# Patient Record
(demographics unavailable — no encounter records)

---

## 2024-09-26 NOTE — REVIEW OF SYSTEMS
[Negative] : Allergic/Immunologic [Anal Pain: Grade 0] : Anal Pain: Grade 0 [Constipation: Grade 0] : Constipation: Grade 0 [Diarrhea: Grade 0] : Diarrhea: Grade 0 [Dyspepsia: Grade 0] : Dyspepsia: Grade 0 [Dysphagia: Grade 0] : Dysphagia: Grade 0 [Esophagitis: Grade 0] : Esophagitis: Grade 0 [Fecal Incontinence: Grade 0] : Fecal Incontinence: Grade 0 [Gastroparesis: Grade 0] : Gastroparesis: Grade 0 [Nausea: Grade 0] : Nausea: Grade 0 [Proctitis: Grade 0] : Proctitis: Grade 0 [Rectal Pain: Grade 0] : Rectal Pain: Grade 0 [Small Intestinal Obstruction: Grade 0] : Small Intestinal Obstruction: Grade 0 [Vomiting: Grade 0] : Vomiting: Grade 0 [Hematuria: Grade 0] : Hematuria: Grade 0 [Urinary Incontinence: Grade 0] : Urinary Incontinence: Grade 0  [Urinary Retention: Grade 0] : Urinary Retention: Grade 0 [Urinary Tract Pain: Grade 0] : Urinary Tract Pain: Grade 0

## 2024-10-01 NOTE — HISTORY OF PRESENT ILLNESS
[FreeTextEntry1] :   Percy Javier is 55-year-old male with a no family history of Prostate Ca, PMH Lunga Ca s/p right lobectomy. he is under the care of Dr. Esteves for elevated PSA and underwent MRI Prostate on 5/2024 that revealed PI-RADS 3, subtle focal finding in the right posterior lateral PZ at apex and then 5/23/2024 fusion prostate biopsy that revealed 6 out of 14 cores are positive, 20%-90% tissue involvement GG2, a diagnosis favorable intermediate risk Prostatic Adenocarcinoma, On 8/12/2024 underwent Transperineal prostate brachytherapy wit spacer placement. He tolerated the procedure well without developing any Grade 3 or higher acute toxicity as a result of his treatment and the KPS remained stable during the course of treatment.           3/27/2024: 5.98ng/mL 4/23/2024: 4.60ng/mL 3/26/2024:  5.98ng/mL  MRI Prostate with and without contrast 5/8/2024  IMPRESSION: Study partially degraded by artifact due to the left hip metal total arthroplasty. PIRADS 3 - Intermediate (the presence of clinically significant cancer is equivocal). Subtle focal finding right posterior lateral PZ at apex. Prostate Volume: 19.9 mL PSA density: 0.23 ng/mL/mL  LESION: #1 Location: Right posterior lateral PZ at apex. Slice#: Series 3, Image 17. Size (transverse, AP, CC): 7.2 x 3.0 x 7.8 mm. T2-WI: Moderately hypointense DWI: Mildly hypointense on ADC map and mildly hyperintense on DWI DCE: Enhancement Extra-prostatic extension: None. Abuts capsule Prior Comparison: None. PI-RADS Assessment Category: 3  Neurovascular bundle: No evidence of neurovascular bundle invasion. Seminal vesicles: No seminal vesicle invasion. Lymph nodes: No pelvic adenopathy. Bones: No suspicious lesions identified. Urinary bladder: Under distended.. Other: Left hip total arthroplasty. Grade 1 anterior spondylolisthesis of L5 on S1. Probable enhancing tendinosis in relation to the right gluteus medius insertion on greater trochanter. 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 Monroe is here for post treatment evaluation.  ?? He denies urgency, urinary frequency, nocturia, good/weak stream,  dribbling, urinary retention, dysuria, hematuria, leakage or incontinence. ?? Bowel ?? Sexually active ??Flomax/Lupron - 3rd dose in August ?? Urologist ?? PSA ?? Biopsy ?/On chemo ?./New scan

## 2024-10-01 NOTE — DISEASE MANAGEMENT
[2] : T2 [b] : b [0-10] : 0 -10 ng/mL [Biopsy with Fusion] : Patient had a biopsy with fusion on [3] : 3 [IIB] : IIB [] : Patient had no Bone Scan performed [BiopsyDate] : 05/2024 [MeasuredProstateVolume] : 19.9cc [TotalCores] : 14 [TotalPositiveCores] : 6 [MaxCoreInvolvement] : 90%

## 2024-10-23 NOTE — REVIEW OF SYSTEMS
[Negative] : Allergic/Immunologic [Anal Pain: Grade 0] : Anal Pain: Grade 0 [Constipation: Grade 0] : Constipation: Grade 0 [Diarrhea: Grade 0] : Diarrhea: Grade 0 [Dyspepsia: Grade 0] : Dyspepsia: Grade 0 [Dysphagia: Grade 0] : Dysphagia: Grade 0 [Esophagitis: Grade 0] : Esophagitis: Grade 0 [Fecal Incontinence: Grade 0] : Fecal Incontinence: Grade 0 [Gastroparesis: Grade 0] : Gastroparesis: Grade 0 [Nausea: Grade 0] : Nausea: Grade 0 [Proctitis: Grade 0] : Proctitis: Grade 0 [Rectal Pain: Grade 0] : Rectal Pain: Grade 0 [Small Intestinal Obstruction: Grade 0] : Small Intestinal Obstruction: Grade 0 [Vomiting: Grade 0] : Vomiting: Grade 0 [Hematuria: Grade 0] : Hematuria: Grade 0 [Urinary Incontinence: Grade 0] : Urinary Incontinence: Grade 0  [Urinary Retention: Grade 0] : Urinary Retention: Grade 0 [Urinary Tract Pain: Grade 0] : Urinary Tract Pain: Grade 0 [Nocturia] : nocturia [Urinary Frequency] : urinary frequency [IPSS Score (0-40): ___] : IPSS score: [unfilled] [EPIC-CP Score (0-60): ___] : EPIC-CP score: [unfilled] [Urinary Urgency: Grade 2 - Limiting instrumental ADL; medical management indicated] : Urinary Urgency: Grade 2 - Limiting instrumental ADL; medical management indicated [Urinary Frequency: Grade 2 - Limiting instrumental ADL; medical management indicated] : Urinary Frequency: Grade 2 - Limiting instrumental ADL; medical management indicated [Ejaculation Disorder: Grade 1 - Diminished ejaculation] : Ejaculation Disorder: Grade 1 - Diminished ejaculation  [Erectile Dysfunction: Grade 1 - Decrease in erectile function (frequency or rigidity of erections) but intervention not indicated (e.g., medication or use of mechanical device, penile pump)] : Erectile Dysfunction: Grade 1 - Decrease in erectile function (frequency or rigidity of erections) but intervention not indicated (e.g., medication or use of mechanical device, penile pump)

## 2024-10-23 NOTE — HISTORY OF PRESENT ILLNESS
[FreeTextEntry1] :   Percy Javier is 55-year-old male with a no family history of Prostate Ca, PMH Lunga Ca s/p right lobectomy. he is under the care of Dr. Esteves for elevated PSA and underwent MRI Prostate on 5/2024 that revealed PI-RADS 3, subtle focal finding in the right posterior lateral PZ at apex and then 5/23/2024 fusion prostate biopsy that revealed 6 out of 14 cores are positive, 20%-90% tissue involvement GG2, a diagnosis favorable intermediate risk Prostatic Adenocarcinoma, On 8/12/2024 he underwent Transperineal prostate brachytherpay with spacer placement. He tolerated the procedure  well without developing any Grade 3 or higher acute toxicity as a result of his treatment and the KPS remained stable during the course of radiation treatment.  Urologist: Dr. Esteves  PSA Trend: ng/mL  3/27/2024: 5.98ng/mL 4/23/2024: 4.60ng/mL 10/14/2024: 2.80ng/mL  MRI Prostate with and without contrast 5/8/2024  IMPRESSION: Study partially degraded by artifact due to the left hip metal total arthroplasty. PIRADS 3 - Intermediate (the presence of clinically significant cancer is equivocal). Subtle focal finding right posterior lateral PZ at apex. Prostate Volume: 19.9 mL PSA density: 0.23 ng/mL/mL  LESION: #1 Location: Right posterior lateral PZ at apex. Slice#: Series 3, Image 17. Size (transverse, AP, CC): 7.2 x 3.0 x 7.8 mm. T2-WI: Moderately hypointense DWI: Mildly hypointense on ADC map and mildly hyperintense on DWI DCE: Enhancement Extra-prostatic extension: None. Abuts capsule Prior Comparison: None. PI-RADS Assessment Category: 3  Neurovascular bundle: No evidence of neurovascular bundle invasion. Seminal vesicles: No seminal vesicle invasion. Lymph nodes: No pelvic adenopathy. Bones: No suspicious lesions identified. Urinary bladder: Under distended.. Other: Left hip total arthroplasty. Grade 1 anterior spondylolisthesis of L5 on S1. Probable enhancing tendinosis in relation to the right gluteus medius insertion on greater trochanter. Large intermuscular lipoma noted laterally to the left hip measuring 9.7 x 8.9 x 4.9 cm  PATHOLOGY 5/23/2024  6 out of 14 cores are positive, 20%-90% tissue involvment, Gleson Score 7(3+4) GG2  1 core 3+4+7 5 cores3+3=6    Mr. Monroe is here for a post treatment evaluation. Overall, he has a little lack of energy, but he has an active scheduled Advised him to slow it down and take rest periods. He has some urinary frequency and urgency at times with a weak stream He is taking Flomax 1 pill and advised him to take 2 at bedtime. He denies hematuria, dysuria, incontinence of urine or dribbling. Bowel movements are normal. He is sexually active and is able to maintain an erectin with ejaucualtion but at times it is slow. He take no ED medications .

## 2025-01-26 NOTE — HISTORY OF PRESENT ILLNESS
[FreeTextEntry1] :   Percy Javier is 55-year-old male with a no family history of Prostate Ca, PMH Lunga Ca s/p right lobectomy. he is under the care of Dr. Esteves for elevated PSA and underwent MRI Prostate on 5/2024 that revealed PI-RADS 3, subtle focal finding in the right posterior lateral PZ at apex and then 5/23/2024 fusion prostate biopsy that revealed 6 out of 14 cores are positive, 20%-90% tissue involvement GG2, a diagnosis favorable intermediate risk Prostatic Adenocarcinoma, On 8/12/2024 he underwent Transperineal prostate brachytherpay with spacer placement. He tolerated the procedure  well without developing any Grade 3 or higher acute toxicity as a result of his treatment and the KPS remained stable during the course of radiation treatment.  Urologist: Dr. Esteves  PSA Trend: ng/mL  3/27/2024: 5.98ng/mL 4/23/2024: 4.60ng/mL 10/14/2024: 2.80ng/mL  MRI Prostate with and without contrast 5/8/2024  IMPRESSION: Study partially degraded by artifact due to the left hip metal total arthroplasty. PIRADS 3 - Intermediate (the presence of clinically significant cancer is equivocal). Subtle focal finding right posterior lateral PZ at apex. Prostate Volume: 19.9 mL PSA density: 0.23 ng/mL/mL  LESION: #1 Location: Right posterior lateral PZ at apex. Slice#: Series 3, Image 17. Size (transverse, AP, CC): 7.2 x 3.0 x 7.8 mm. T2-WI: Moderately hypointense DWI: Mildly hypointense on ADC map and mildly hyperintense on DWI DCE: Enhancement Extra-prostatic extension: None. Abuts capsule Prior Comparison: None. PI-RADS Assessment Category: 3  Neurovascular bundle: No evidence of neurovascular bundle invasion. Seminal vesicles: No seminal vesicle invasion. Lymph nodes: No pelvic adenopathy. Bones: No suspicious lesions identified. Urinary bladder: Under distended.. Other: Left hip total arthroplasty. Grade 1 anterior spondylolisthesis of L5 on S1. Probable enhancing tendinosis in relation to the right gluteus medius insertion on greater trochanter. Large intermuscular lipoma noted laterally to the left hip measuring 9.7 x 8.9 x 4.9 cm  PATHOLOGY 5/23/2024  6 out of 14 cores are positive, 20%-90% tissue involvment, Gleson Score 7(3+4) GG2  1 core 3+4+7 5 cores3+3=6    Mr. Monroe is here for a folow up appt    Overall, he has a little lack of energy, but he has an active scheduled Advised him to slow it down and take rest periods. He has some urinary frequency and urgency at times with a weak stream He is taking Flomax 1 pill and advised him to take 2 at bedtime. He denies hematuria, dysuria, incontinence of urine or dribbling. Bowel movements are normal. He is sexually active and is able to maintain an erectin with ejaucualtion but at times it is slow. He take no ED medications .

## 2025-01-26 NOTE — DISEASE MANAGEMENT
[2] : T2 [b] : b [0-10] : 0 -10 ng/mL [Biopsy with Fusion] : Patient had a biopsy with fusion on [] : Patient had a Prostate MRI [3] : 3 [BiopsyDate] : 05/2024 [TotalCores] : 14 [MeasuredProstateVolume] : 19.9cc [TotalPositiveCores] : 6 [MaxCoreInvolvement] : 90% [IIB] : IIB

## 2025-02-06 NOTE — HISTORY OF PRESENT ILLNESS
[Home] : at home, [unfilled] , at the time of the visit. [Medical Office: ([facility])___] : at the medical office located in  [Verbal consent obtained from patient] : the patient, [unfilled] [FreeTextEntry1] :   Percy Javier is 55-year-old male with a no family history of Prostate Ca, PMH Lunga Ca s/p right lobectomy. he is under the care of Dr. Esteves for elevated PSA and underwent MRI Prostate on 5/2024 that revealed PI-RADS 3, subtle focal finding in the right posterior lateral PZ at apex and then 5/23/2024 fusion prostate biopsy that revealed 6 out of 14 cores are positive, 20%-90% tissue involvement GG2, a diagnosis favorable intermediate risk Prostatic Adenocarcinoma, On 8/12/2024 he underwent Transperineal prostate brachytherpay with spacer placement. He tolerated the procedure  well without developing any Grade 3 or higher acute toxicity as a result of his treatment and the KPS remained stable during the course of radiation treatment.  Urologist: Dr. Esteves  PSA Trend: ng/mL  3/27/2024: 5.98ng/mL 4/23/2024: 4.60ng/mL 10/14/2024: 2.80ng/mL 2/7/25: 2.75ng/ml  MRI Prostate with and without contrast 5/8/2024  IMPRESSION: Study partially degraded by artifact due to the left hip metal total arthroplasty. PIRADS 3 - Intermediate (the presence of clinically significant cancer is equivocal). Subtle focal finding right posterior lateral PZ at apex. Prostate Volume: 19.9 mL PSA density: 0.23 ng/mL/mL  LESION: #1 Location: Right posterior lateral PZ at apex. Slice#: Series 3, Image 17. Size (transverse, AP, CC): 7.2 x 3.0 x 7.8 mm. T2-WI: Moderately hypointense DWI: Mildly hypointense on ADC map and mildly hyperintense on DWI DCE: Enhancement Extra-prostatic extension: None. Abuts capsule Prior Comparison: None. PI-RADS Assessment Category: 3  Neurovascular bundle: No evidence of neurovascular bundle invasion. Seminal vesicles: No seminal vesicle invasion. Lymph nodes: No pelvic adenopathy. Bones: No suspicious lesions identified. Urinary bladder: Under distended.. Other: Left hip total arthroplasty. Grade 1 anterior spondylolisthesis of L5 on S1. Probable enhancing tendinosis in relation to the right gluteus medius insertion on greater trochanter. Large intermuscular lipoma noted laterally to the left hip measuring 9.7 x 8.9 x 4.9 cm  PATHOLOGY 5/23/2024  6 out of 14 cores are positive, 20%-90% tissue involvment, Gleson Score 7(3+4) GG2  1 core 3+4+7 5 cores3+3=6    Mr. Monroe is here for a folow up appt    Overall, he has a little lack of energy, but he has an active scheduled Advised him to slow it down and take rest periods. He has some urinary frequency and urgency at times with a weak stream He is taking Flomax 1 pill and advised him to take 2 at bedtime. He denies hematuria, dysuria, incontinence of urine or dribbling. Bowel movements are normal. He is sexually active and is able to maintain an erectin with ejaucualtion but at times it is slow. He take no ED medications .

## 2025-02-06 NOTE — PHYSICAL EXAM
[de-identified] : telehealth [de-identified] : telehealth [de-identified] : telehealth [de-identified] : telehealth [de-identified] : telehealth [de-identified] : telehealth [de-identified] : telehealth [de-identified] : [facility] [de-identified] : telehealth [FreeTextEntry1] : telehealth [de-identified] : telehealth [de-identified] : telehealth [de-identified] : telehealth [de-identified] : telehealth [de-identified] : telehealth [de-identified] : telehealth [de-identified] : telehealth

## 2025-02-06 NOTE — PHYSICAL EXAM
[de-identified] : telehealth [de-identified] : telehealth [de-identified] : telehealth [de-identified] : telehealth [de-identified] : telehealth [de-identified] : telehealth [de-identified] : telehealth [de-identified] : [facility] [de-identified] : telehealth [FreeTextEntry1] : telehealth [de-identified] : telehealth [de-identified] : telehealth [de-identified] : telehealth [de-identified] : telehealth [de-identified] : telehealth [de-identified] : telehealth [de-identified] : telehealth

## 2025-02-06 NOTE — DISEASE MANAGEMENT
[] : Patient had no Bone Scan performed [BiopsyDate] : 05/2024 [MeasuredProstateVolume] : 19.9cc [TotalCores] : 14 [TotalPositiveCores] : 6 [MaxCoreInvolvement] : 90%

## 2025-02-06 NOTE — REVIEW OF SYSTEMS
[IPSS Score (0-40): ___] : IPSS score: [unfilled] [EPIC-CP Score (0-60): ___] : EPIC-CP score: [unfilled] [Anal Pain: Grade 0] : Anal Pain: Grade 0 [Constipation: Grade 0] : Constipation: Grade 0 [Diarrhea: Grade 0] : Diarrhea: Grade 0 [Dyspepsia: Grade 0] : Dyspepsia: Grade 0 [Dysphagia: Grade 0] : Dysphagia: Grade 0 [Esophagitis: Grade 0] : Esophagitis: Grade 0 [Fecal Incontinence: Grade 0] : Fecal Incontinence: Grade 0 [Gastroparesis: Grade 0] : Gastroparesis: Grade 0 [Nausea: Grade 0] : Nausea: Grade 0 [Proctitis: Grade 0] : Proctitis: Grade 0 [Rectal Pain: Grade 0] : Rectal Pain: Grade 0 [Small Intestinal Obstruction: Grade 0] : Small Intestinal Obstruction: Grade 0 [Vomiting: Grade 0] : Vomiting: Grade 0 [Hematuria: Grade 0] : Hematuria: Grade 0 [Urinary Incontinence: Grade 0] : Urinary Incontinence: Grade 0  [Urinary Retention: Grade 0] : Urinary Retention: Grade 0 [Urinary Tract Pain: Grade 0] : Urinary Tract Pain: Grade 0 [Urinary Urgency: Grade 1 - Present] : Urinary Urgency: Grade 1 - Present [Urinary Frequency: Grade 1 - Present] : Urinary Frequency: Grade 1 - Present [Ejaculation Disorder: Grade 1 - Diminished ejaculation] : Ejaculation Disorder: Grade 1 - Diminished ejaculation  [Erectile Dysfunction: Grade 1 - Decrease in erectile function (frequency or rigidity of erections) but intervention not indicated (e.g., medication or use of mechanical device, penile pump)] : Erectile Dysfunction: Grade 1 - Decrease in erectile function (frequency or rigidity of erections) but intervention not indicated (e.g., medication or use of mechanical device, penile pump) [FreeTextEntry2] : telehealth [FreeTextEntry3] : telehealth [FreeTextEntry4] : telehealth [FreeTextEntry5] : telehealth [FreeTextEntry6] : telehealth [FreeTextEntry7] : telehealth [FreeTextEntry9] : telehealth [de-identified] : telehealth [de-identified] : telehealth [de-identified] : telehealth [de-identified] : telehealth [de-identified] : telehealth

## 2025-02-06 NOTE — HISTORY OF PRESENT ILLNESS
[Home] : at home, [unfilled] , at the time of the visit. [Medical Office: ([facility])___] : at the medical office located in  [Verbal consent obtained from patient] : the patient, [unfilled] [FreeTextEntry1] :   Percy Javier is 55-year-old male with a no family history of Prostate Ca, PMH Lunga Ca s/p right lobectomy. he is under the care of Dr. Esteves for elevated PSA and underwent MRI Prostate on 5/2024 that revealed PI-RADS 3, subtle focal finding in the right posterior lateral PZ at apex and then 5/23/2024 fusion prostate biopsy that revealed 6 out of 14 cores are positive, 20%-90% tissue involvement GG2, a diagnosis favorable intermediate risk Prostatic Adenocarcinoma, On 8/12/2024 he underwent Transperineal prostate brachytherpay with spacer placement. He tolerated the procedure  well without developing any Grade 3 or higher acute toxicity as a result of his treatment and the KPS remained stable during the course of radiation treatment.  Urologist: Dr. Esteves  PSA Trend: ng/mL  3/27/2024: 5.98ng/mL 4/23/2024: 4.60ng/mL 10/14/2024: 2.80ng/mL 2/7/25: 2.75ng/ml  MRI Prostate with and without contrast 5/8/2024  IMPRESSION: Study partially degraded by artifact due to the left hip metal total arthroplasty. PIRADS 3 - Intermediate (the presence of clinically significant cancer is equivocal). Subtle focal finding right posterior lateral PZ at apex. Prostate Volume: 19.9 mL PSA density: 0.23 ng/mL/mL  LESION: #1 Location: Right posterior lateral PZ at apex. Slice#: Series 3, Image 17. Size (transverse, AP, CC): 7.2 x 3.0 x 7.8 mm. T2-WI: Moderately hypointense DWI: Mildly hypointense on ADC map and mildly hyperintense on DWI DCE: Enhancement Extra-prostatic extension: None. Abuts capsule Prior Comparison: None. PI-RADS Assessment Category: 3  Neurovascular bundle: No evidence of neurovascular bundle invasion. Seminal vesicles: No seminal vesicle invasion. Lymph nodes: No pelvic adenopathy. Bones: No suspicious lesions identified. Urinary bladder: Under distended.. Other: Left hip total arthroplasty. Grade 1 anterior spondylolisthesis of L5 on S1. Probable enhancing tendinosis in relation to the right gluteus medius insertion on greater trochanter. Large intermuscular lipoma noted laterally to the left hip measuring 9.7 x 8.9 x 4.9 cm  PATHOLOGY 5/23/2024  6 out of 14 cores are positive, 20%-90% tissue involvment, Gleson Score 7(3+4) GG2  1 core 3+4+7 5 cores3+3=6    Mr. Monroe is here for a folow up appt    Overall, he has a little lack of energy, but he has an active scheduled Advised him to slow it down and take rest periods. He has some urinary frequency and urgency at times with a weak stream He is taking Flomax 1 pill and advised him to take 2 at bedtime. He denies hematuria, dysuria, incontinence of urine or dribbling. Bowel movements are normal. He is sexually active and is able to maintain an erectin with ejaucualtion but at times it is slow. He take no ED medications .